# Patient Record
Sex: FEMALE | Race: WHITE
[De-identification: names, ages, dates, MRNs, and addresses within clinical notes are randomized per-mention and may not be internally consistent; named-entity substitution may affect disease eponyms.]

---

## 2019-04-18 ENCOUNTER — HOSPITAL ENCOUNTER (EMERGENCY)
Dept: HOSPITAL 1 - ED | Age: 44
Discharge: HOME | End: 2019-04-18
Payer: COMMERCIAL

## 2019-04-18 VITALS
HEIGHT: 66 IN | WEIGHT: 180 LBS | HEIGHT: 66 IN | WEIGHT: 180 LBS | BODY MASS INDEX: 28.93 KG/M2 | BODY MASS INDEX: 28.93 KG/M2

## 2019-04-18 VITALS — SYSTOLIC BLOOD PRESSURE: 138 MMHG | DIASTOLIC BLOOD PRESSURE: 93 MMHG

## 2019-04-18 DIAGNOSIS — F17.210: ICD-10-CM

## 2019-04-18 DIAGNOSIS — K12.2: Primary | ICD-10-CM

## 2021-02-20 ENCOUNTER — HOSPITAL ENCOUNTER (EMERGENCY)
Dept: HOSPITAL 1 - ED | Age: 46
Discharge: HOME | End: 2021-02-20
Payer: COMMERCIAL

## 2021-02-20 VITALS — HEIGHT: 66 IN | WEIGHT: 166 LBS | BODY MASS INDEX: 26.68 KG/M2

## 2021-02-20 VITALS — DIASTOLIC BLOOD PRESSURE: 78 MMHG | SYSTOLIC BLOOD PRESSURE: 135 MMHG
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
## 2021-02-20 DIAGNOSIS — L03.113: Primary | ICD-10-CM

## 2021-02-21 ENCOUNTER — HOSPITAL ENCOUNTER (EMERGENCY)
Dept: HOSPITAL 1 - ED | Age: 46
Discharge: HOME | End: 2021-02-21
Payer: COMMERCIAL

## 2021-02-21 VITALS — HEIGHT: 66 IN | WEIGHT: 170 LBS | BODY MASS INDEX: 27.32 KG/M2

## 2021-02-21 VITALS — SYSTOLIC BLOOD PRESSURE: 138 MMHG | DIASTOLIC BLOOD PRESSURE: 80 MMHG
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
## 2021-02-21 DIAGNOSIS — L03.113: Primary | ICD-10-CM
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
## 2021-02-23 ENCOUNTER — HOSPITAL ENCOUNTER (INPATIENT)
Dept: HOSPITAL 1 - ED | Age: 46
LOS: 2 days | Discharge: HOME | DRG: 383 | End: 2021-02-25
Payer: COMMERCIAL

## 2021-02-23 VITALS — BODY MASS INDEX: 28.12 KG/M2 | WEIGHT: 175 LBS | HEIGHT: 66 IN

## 2021-02-23 DIAGNOSIS — Z79.01: ICD-10-CM

## 2021-02-23 DIAGNOSIS — F17.210: ICD-10-CM

## 2021-02-23 DIAGNOSIS — Z79.899: ICD-10-CM

## 2021-02-23 DIAGNOSIS — Z20.822: ICD-10-CM

## 2021-02-23 DIAGNOSIS — Z79.891: ICD-10-CM

## 2021-02-23 DIAGNOSIS — L03.113: Primary | ICD-10-CM

## 2021-02-23 LAB
ALBUMIN SERPL-MCNC: 2.9 G/DL (ref 3.4–5)
ALP SERPL-CCNC: 76 U/L (ref 46–116)
ALT SERPL-CCNC: 33 U/L (ref 14–59)
AST SERPL-CCNC: 14 U/L (ref 15–37)
BASOPHILS NFR BLD: 1.1 % (ref 0.2–1.3)
BILIRUB SERPL-MCNC: 0.2 MG/DL (ref 0.2–1)
BUN SERPL-MCNC: 7 MG/DL (ref 7–18)
CALCIUM SERPL-MCNC: 9.1 MG/DL (ref 8.5–10.1)
CHLORIDE SERPL-SCNC: 100 MMOL/L (ref 98–107)
CO2 SERPL-SCNC: 26.6 MMOL/L (ref 21–32)
CREAT SERPL-MCNC: 0.6 MG/DL (ref 0.6–1)
ERYTHROCYTE [DISTWIDTH] IN BLOOD BY AUTOMATED COUNT: 13.2 % (ref 12.3–17.7)
GFR SERPLBLD BASED ON 1.73 SQ M-ARVRAT: > 60 ML/MIN
GLUCOSE SERPL-MCNC: 120 MG/DL (ref 74–106)
PLATELET # BLD: 309 X10^3MCL (ref 179–408)
POTASSIUM SERPL-SCNC: 3.6 MMOL/L (ref 3.5–5.1)
PROT SERPL-MCNC: 6.6 G/DL (ref 6.4–8.2)
SODIUM SERPL-SCNC: 137 MMOL/L (ref 136–145)

## 2021-02-23 PROCEDURE — G0378 HOSPITAL OBSERVATION PER HR: HCPCS

## 2021-02-24 VITALS — DIASTOLIC BLOOD PRESSURE: 88 MMHG | SYSTOLIC BLOOD PRESSURE: 138 MMHG

## 2021-02-24 VITALS — SYSTOLIC BLOOD PRESSURE: 117 MMHG | DIASTOLIC BLOOD PRESSURE: 68 MMHG

## 2021-02-24 VITALS — SYSTOLIC BLOOD PRESSURE: 129 MMHG | DIASTOLIC BLOOD PRESSURE: 77 MMHG

## 2021-02-24 VITALS — DIASTOLIC BLOOD PRESSURE: 89 MMHG | SYSTOLIC BLOOD PRESSURE: 139 MMHG

## 2021-02-24 VITALS — SYSTOLIC BLOOD PRESSURE: 113 MMHG | DIASTOLIC BLOOD PRESSURE: 61 MMHG

## 2021-02-25 VITALS — SYSTOLIC BLOOD PRESSURE: 134 MMHG | DIASTOLIC BLOOD PRESSURE: 79 MMHG

## 2021-02-25 VITALS — SYSTOLIC BLOOD PRESSURE: 135 MMHG | DIASTOLIC BLOOD PRESSURE: 87 MMHG

## 2021-02-25 VITALS — DIASTOLIC BLOOD PRESSURE: 87 MMHG | SYSTOLIC BLOOD PRESSURE: 135 MMHG

## 2021-02-25 VITALS — DIASTOLIC BLOOD PRESSURE: 89 MMHG | SYSTOLIC BLOOD PRESSURE: 152 MMHG

## 2021-02-25 LAB
BASOPHILS NFR BLD: 0.3 % (ref 0.2–1.3)
BUN SERPL-MCNC: 8 MG/DL (ref 7–18)
CALCIUM SERPL-MCNC: 9.1 MG/DL (ref 8.5–10.1)
CHLORIDE SERPL-SCNC: 102 MMOL/L (ref 98–107)
CO2 SERPL-SCNC: 27.1 MMOL/L (ref 21–32)
CREAT SERPL-MCNC: 0.7 MG/DL (ref 0.6–1)
ERYTHROCYTE [DISTWIDTH] IN BLOOD BY AUTOMATED COUNT: 12.5 % (ref 12.3–17.7)
GFR SERPLBLD BASED ON 1.73 SQ M-ARVRAT: > 60 ML/MIN
GLUCOSE SERPL-MCNC: 105 MG/DL (ref 74–106)
MAGNESIUM SERPL-MCNC: 2 MG/DL (ref 1.8–2.4)
PLATELET # BLD: 354 X10^3MCL (ref 179–408)
POTASSIUM SERPL-SCNC: 3.5 MMOL/L (ref 3.5–5.1)
SODIUM SERPL-SCNC: 137 MMOL/L (ref 136–145)

## 2021-02-25 NOTE — NUR
ADELAIDA PHAM AT BEDSIDE PERFORMING ULTRASOUND GUIDED IV
ADELAIDA PHAM STARTED 18G ULTRASOUND GUIDED IV ON R BICEP
NURSE ANTONINO RN ATTEMPTED PLACING IV, UNSUCCESSFUL. NURSE LUIS TAVARES LOOKING NOW
PATIENT SITTING UP ON SIDE OF BED EATING BREAKFAST. REDNESS AND EDEMA TO RIGHT
ARM. DENIES NEEDING ANYTHING AT THIS TIME. BED LOCKED IN LOWEST POSITION. CALL
LIGHT IN REACH.
PREVIOUS IV INSERTED BY PA SON NOT PATENT, PT COMPLAINING OF PRESSURE IN RIGHT
BICEP, DR. HAWK BEDSIDE FOR IV ASSESSMENT, RIGHT BICEP IV INFILTRATING,
INSTRUCTED TO REMOVED, DR. HAWK INSERTED NEW 20G IV IN RIGHT FOREARM VIA US,
IV PATENT, FLUSHED WITH 10mL NS WITHOUT DIFFICULTY, GOOD BLOOD RETURN, NO
COMPLICATIONS, HEMATOMA, OR SIGNS OF INFILTRATION.
PT AMBULATED TO RESTROOM TO URINATE--PROVIDING URINE COLLECTION
PT BIB BROTHER C/O CAT BITE ON 02/19/21. [place] [facility] [place] ANTIBIOTICS AT [place]/C. PT R WRIST APPEARS TO BE RED AND SWOLLEN. PT
REPORTS PAIN 9/10 AT THIS TIME. PT NAD NOTED, RESP E/U, AAOX4 LYING ON GURNEY
IN LOWEST POSITION AND SIDE RAILSX2 FOR SAFETY
PT C/O 8/10 RIGHT HAND/FOREARM PAIN, MEDICATED PER EMAR.
PT C/O 8/10 RIGHT HAND/RIGHT WRIST PAIN, REQUESTING PAIN MEDICATION. MEDICATED
PER EMAR.
PT HANDS APPEAR TO BE SWOLLEN, MD MADE AWARE. PER MD ORDER, TO PUT ICE PACKS
AND CONTINUE TO MONITOR CLOSELY.
PT IN GURNEY SLEEPING, EASY TO AROUSE. PT DENIES HAVING ANY BELONGINGS AND STS
SHE DOES NOT TAKE ANY MEDICATIONS AT HOME. PT NAD NOTED, RESP E/U, SLEEPING IN
GURNEY AT LOWEST POSITION, SIDE RAILSX2 FOR SAFETY
PT IN [facility], NAD NOTED, RESP E/U, AAOX4. UNABLE TO BEING IV MEDICINE
AND FLUIDS BEING THAT MYSELF, LUIS RN, ANTOINNO RN, AND TOREY RN WERE UNABLE TO
START IV. TOREY TAVARES INFORMED ME THAT ADELAIDA PHAM WILL BEGIN AN ULTRASOUND GUIDED
IV. AWAITNG IV PLACEMENT TO START MEDS ON EMAR.
PT REASSESS BY ALIE GARCIA TO GO UP TO MEDICAL SURGICAL. VANCOMYCIN INFUSION
BEGUN AND SENT UP WITH PT.
PT REPORTS TAKING "HALF AN OCYCOTIN AT 1530" FOR THE PAIN
PT SLEEPING COMFORTABLY IN BED. NO S/S OF ACUTE DISTRESS. NO C/O OF PAIN OR
DISCOMFORT. RR EVEN AND UNLABORED ON RA. ALL NEEDS MET DURING SHIFT. BED IN
LOWEST POSITION. CALL LIGHT WITHIN REACH. SIDE RAILS UP X2. WILL ENDORSE CARE
TO ONCOMING DAY SHIFT NURSE.
PT SLEEPING COMFORTABLY IN BED. NO S/S OF ACUTE DISTRESS. NO C/O OF PAIN OR
DISCOMFORT. RR EVEN AND UNLABORED ON RA. BED IN LOWEST POSITION. CALL LIGHT
WITHIN REACH. SIDE RAILS UP X2.
RECEIVED PT FROM DAY SHIFT NURSE. PT AWAKE, A&O X4. DENIES HA OR DIZZINESS.
ABLE TO MAKE NEEDS KNOWN. SPEECH IS CLEAR. PT IS MED-SURG. DENIES CHEST PAIN
OR PRESSURE. RR EVEN AND UNLABORED ON RA. ERYTHEMA AND EDEMA NOTED TO RIGHT
ARM. IV SITE PATENT AND INTACT. BED IN LOWEST POSITION. CALL LIGHT WITHIN
REACH. SIDE RAILS UP X2.
SPOKE TO PHARMACY ABOUT VANCOMYCIN ORDER. PHARMACY STS THAT HE WILL REACTIVATE
THE VANCOMYCIN ORDER AND THAT THE MORNING PHARMACY WITH DOSE AND TIME THE Q6
VANCOMYCIN ORDER. MD STS THAT HE WOULD LIKE BOTH UNASYN AND VANCO ANTIBIOTICS
Q6H. MD STS THAT HE WOULD LIKE PHARMACY TO DOSE AND TIME THE VANCOMYCIN AS WELL
UNABLE TO BEGIN VANCOMYCIN AND 0.9NS IV FLUIDS STOPPED PER MD ORDER. WILL BEGIN
INFUSIONS UPON MD OK
WENT OVER DISCHARGE PAPERWORK WITH PATIENT. ANSWERED ALL QUESTIONS. 2
ANTIBIOTICS CALLED IN TO PHARMACY BY MD. NOTIFIED PATIENT TO 
MEDICATIONS. REMOVED IV INTACT, NO PROBLEMS. MED SURG PATIENT, NO TELE.
PATIENT IS GETTING DRESSED AND READY, WILL NOTIFY STAFF WHEN READY AND RIDE IS
HERE. WILL LEAVE IN PRIVATE VEHICLE WITH ALL PERSONAL BELONGINGS. INSTRUCTED
TO GO SEE HER DOCTOR, ALSO TO GO TO ER IF PAIN AND SWELLING WORSENS.
no

## 2021-12-12 ENCOUNTER — HOSPITAL ENCOUNTER (EMERGENCY)
Dept: HOSPITAL 26 - MED | Age: 46
Discharge: HOME | End: 2021-12-12
Payer: COMMERCIAL

## 2021-12-12 VITALS — BODY MASS INDEX: 26.36 KG/M2 | HEIGHT: 66 IN | WEIGHT: 164 LBS

## 2021-12-12 VITALS — SYSTOLIC BLOOD PRESSURE: 131 MMHG | DIASTOLIC BLOOD PRESSURE: 79 MMHG

## 2021-12-12 VITALS — SYSTOLIC BLOOD PRESSURE: 136 MMHG | DIASTOLIC BLOOD PRESSURE: 87 MMHG

## 2021-12-12 DIAGNOSIS — F17.210: ICD-10-CM

## 2021-12-12 DIAGNOSIS — Z79.1: ICD-10-CM

## 2021-12-12 DIAGNOSIS — Z79.899: ICD-10-CM

## 2021-12-12 DIAGNOSIS — Z79.2: ICD-10-CM

## 2021-12-12 DIAGNOSIS — J02.9: Primary | ICD-10-CM

## 2021-12-12 DIAGNOSIS — I10: ICD-10-CM

## 2021-12-12 PROCEDURE — 99284 EMERGENCY DEPT VISIT MOD MDM: CPT

## 2021-12-12 PROCEDURE — 96372 THER/PROPH/DIAG INJ SC/IM: CPT

## 2021-12-12 RX ADMIN — AMOXICILLIN AND CLAVULANATE POTASSIUM ONE TAB: 875; 125 TABLET, FILM COATED ORAL at 15:03

## 2021-12-12 RX ADMIN — KETOROLAC TROMETHAMINE ONE MG: 30 INJECTION, SOLUTION INTRAMUSCULAR; INTRAVENOUS at 15:44

## 2021-12-12 RX ADMIN — DEXAMETHASONE SODIUM PHOSPHATE ONE MG: 10 INJECTION INTRAMUSCULAR; INTRAVENOUS at 15:42

## 2021-12-12 NOTE — NUR
Patient discharged with v/s stable. Written and verbal after care instructions 
given and explained. 

Patient alert, oriented and verbalized understanding of instructions. 
Ambulatory with steady gait. All questions addressed prior to discharge. ID 
band removed. Patient advised to follow up with PMD. Rx of IBUPROFEN, AUGMENTIN 
& CEPACOL SORE THROAT LOZENGE given. Patient educated on indication of 
medication including possible reaction and side effects. Opportunity to ask 
questions provided and answered.

## 2023-02-17 ENCOUNTER — HOSPITAL ENCOUNTER (EMERGENCY)
Dept: HOSPITAL 26 - MED | Age: 48
Discharge: HOME | End: 2023-02-17
Payer: COMMERCIAL

## 2023-02-17 VITALS — SYSTOLIC BLOOD PRESSURE: 128 MMHG | DIASTOLIC BLOOD PRESSURE: 84 MMHG

## 2023-02-17 VITALS — BODY MASS INDEX: 25.71 KG/M2 | HEIGHT: 66 IN | WEIGHT: 160 LBS

## 2023-02-17 VITALS — SYSTOLIC BLOOD PRESSURE: 134 MMHG | DIASTOLIC BLOOD PRESSURE: 91 MMHG

## 2023-02-17 DIAGNOSIS — M54.50: Primary | ICD-10-CM

## 2023-02-17 DIAGNOSIS — M62.830: ICD-10-CM

## 2023-02-17 PROCEDURE — 96372 THER/PROPH/DIAG INJ SC/IM: CPT

## 2023-02-17 PROCEDURE — 71045 X-RAY EXAM CHEST 1 VIEW: CPT

## 2023-02-17 PROCEDURE — 72100 X-RAY EXAM L-S SPINE 2/3 VWS: CPT

## 2023-02-17 PROCEDURE — 99284 EMERGENCY DEPT VISIT MOD MDM: CPT

## 2023-02-17 NOTE — NUR
PATIENT STATED SHE "IS REFUSING TO GIVE URINE SAMPLE." ER PHYSICIAN, DR COTO, VERBALLY INFORMED PATIENT IS REFUSING TO GIVE SAMPLE. ER PHYSICIAN, 
DR COTO, VERBALIZED UNDERSTANDING.

## 2023-11-28 ENCOUNTER — HOSPITAL ENCOUNTER (EMERGENCY)
Dept: HOSPITAL 26 - MED | Age: 48
Discharge: HOME | End: 2023-11-28
Payer: COMMERCIAL

## 2023-11-28 VITALS
HEART RATE: 96 BPM | SYSTOLIC BLOOD PRESSURE: 134 MMHG | TEMPERATURE: 98.3 F | RESPIRATION RATE: 18 BRPM | OXYGEN SATURATION: 99 % | DIASTOLIC BLOOD PRESSURE: 95 MMHG

## 2023-11-28 VITALS — WEIGHT: 168 LBS | BODY MASS INDEX: 27 KG/M2 | HEIGHT: 66 IN

## 2023-11-28 VITALS
SYSTOLIC BLOOD PRESSURE: 133 MMHG | OXYGEN SATURATION: 99 % | TEMPERATURE: 98 F | HEART RATE: 88 BPM | RESPIRATION RATE: 18 BRPM | DIASTOLIC BLOOD PRESSURE: 88 MMHG

## 2023-11-28 DIAGNOSIS — Z79.1: ICD-10-CM

## 2023-11-28 DIAGNOSIS — Z79.2: ICD-10-CM

## 2023-11-28 DIAGNOSIS — Z79.899: ICD-10-CM

## 2023-11-28 DIAGNOSIS — L20.9: Primary | ICD-10-CM

## 2023-12-18 ENCOUNTER — HOSPITAL ENCOUNTER (EMERGENCY)
Dept: HOSPITAL 26 - MED | Age: 48
Discharge: HOME | End: 2023-12-18
Payer: COMMERCIAL

## 2023-12-18 VITALS
DIASTOLIC BLOOD PRESSURE: 87 MMHG | HEART RATE: 105 BPM | TEMPERATURE: 98 F | SYSTOLIC BLOOD PRESSURE: 152 MMHG | RESPIRATION RATE: 20 BRPM | OXYGEN SATURATION: 98 %

## 2023-12-18 VITALS — HEIGHT: 66 IN | WEIGHT: 160 LBS | BODY MASS INDEX: 25.71 KG/M2

## 2023-12-18 DIAGNOSIS — S00.03XA: Primary | ICD-10-CM
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
## 2023-12-18 DIAGNOSIS — Z79.899: ICD-10-CM
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
## 2024-09-23 ENCOUNTER — HOSPITAL ENCOUNTER (EMERGENCY)
Dept: HOSPITAL 26 - MED | Age: 49
Discharge: HOME | End: 2024-09-23
Payer: COMMERCIAL

## 2024-09-23 VITALS
SYSTOLIC BLOOD PRESSURE: 154 MMHG | HEART RATE: 98 BPM | OXYGEN SATURATION: 95 % | RESPIRATION RATE: 20 BRPM | DIASTOLIC BLOOD PRESSURE: 101 MMHG | TEMPERATURE: 98.3 F

## 2024-09-23 VITALS — HEIGHT: 66 IN | BODY MASS INDEX: 27 KG/M2 | WEIGHT: 168 LBS

## 2024-09-23 VITALS
RESPIRATION RATE: 18 BRPM | HEART RATE: 100 BPM | SYSTOLIC BLOOD PRESSURE: 156 MMHG | DIASTOLIC BLOOD PRESSURE: 103 MMHG | TEMPERATURE: 98.3 F | OXYGEN SATURATION: 95 %

## 2024-09-23 DIAGNOSIS — K02.9: ICD-10-CM
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
## 2024-09-23 DIAGNOSIS — S02.5XXA: Primary | ICD-10-CM
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
## 2024-09-23 DIAGNOSIS — Z79.899: ICD-10-CM
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
## 2024-09-23 PROCEDURE — 96372 THER/PROPH/DIAG INJ SC/IM: CPT

## 2024-09-23 PROCEDURE — 99283 EMERGENCY DEPT VISIT LOW MDM: CPT

## 2024-09-23 RX ADMIN — KETOROLAC TROMETHAMINE ONE MG: 30 INJECTION, SOLUTION INTRAMUSCULAR; INTRAVENOUS at 01:19
